# Patient Record
Sex: FEMALE | NOT HISPANIC OR LATINO | URBAN - METROPOLITAN AREA
[De-identification: names, ages, dates, MRNs, and addresses within clinical notes are randomized per-mention and may not be internally consistent; named-entity substitution may affect disease eponyms.]

---

## 2018-08-13 ENCOUNTER — TELEPHONE (OUTPATIENT)
Dept: OBSTETRICS AND GYNECOLOGY | Facility: CLINIC | Age: 48
End: 2018-08-13

## 2018-08-13 DIAGNOSIS — Z71.3 WEIGHT LOSS COUNSELING, ENCOUNTER FOR: ICD-10-CM

## 2018-08-13 NOTE — TELEPHONE ENCOUNTER
Attempted to call pt to schedule appointment but the only number in pt chart is no longer in service

## 2018-08-13 NOTE — TELEPHONE ENCOUNTER
----- Message from Carli Benavides MD sent at 8/13/2018  4:34 PM CDT -----  [phone]  ----- Message -----  From: Martine Lutz MA  Sent: 8/13/2018   4:22 PM  To: Carli Benavides MD    Tried to call pt to schedule appointment but the only number in her chart is no longer in service.   ----- Message -----  From: Carli Benavides MD  Sent: 8/13/2018   4:17 PM  To: Martine Lutz MA    Please call pt to schedule new pt visit with me for GYN. Needs annual

## 2018-08-13 NOTE — TELEPHONE ENCOUNTER
----- Message from Carli Benavides MD sent at 8/13/2018  4:17 PM CDT -----  Please call pt to schedule new pt visit with me for GYN. Needs annual

## 2018-08-14 ENCOUNTER — TELEPHONE (OUTPATIENT)
Dept: OBSTETRICS AND GYNECOLOGY | Facility: CLINIC | Age: 48
End: 2018-08-14